# Patient Record
(demographics unavailable — no encounter records)

---

## 2025-07-25 NOTE — HISTORY OF PRESENT ILLNESS
[de-identified] : 07/25/2025: Date of Injury/Onset: 1 month ago Pain: At Rest: 2 With Activity: 4 Mechanism of injury: Patient reports left knee pain that began 1 month ago, denies RAYSA. She is ambulating with a cane.  Patient denies N/T. Patient states pain radiates to left shin.  She saw her PCP, had x-rays done at [facility].  This is NOT a Work Related Injury being treated under Worker's Compensation. This is NOT an athletic injury occurring associated with an interscholastic or organized sports team. Quality of symptoms: sharp pain Improves with: ice, Advil  Worse with: walking Prior treatment: n/a Prior Imaging: x-rays  Reports Available For Review Today: no Out of work/sport: retired   7/25/25:  61 y/o F with atraumatic knee pain that began one month prior. States no prior knee problems or history of CSI. Taking advil with relief. Was given meloxicam by PCP with no relief. No PT.   PMH: HTN, COPD

## 2025-07-25 NOTE — PROCEDURE
[FreeTextEntry1] : L knee csi [FreeTextEntry2] :  L knee pain [FreeTextEntry3] : Procedure: Kenalog injection of the left  Knee joint  Indication:  Inflammation and Joint pain.  Risk, benefits and alternatives were discussed with the patient. Potential complications include bleeding and infection.  Alcohol was used to prep the area.  Ethyl chloride spray was used as a topical anesthetic.  Using sterile technique, the aspiration/injection needle was then directed from a lateral and superior aspect.  The procedure was not ultrasound guided.  A 1.5 inch 21g needle was used to inject 3 mL of 1% Lidocaine, 3 mL 0.25% Bupivacaine and 1 mL 40mg/mL triamcinolone.  A bandage was applied.  The patient tolerated the procedure well.  Complications: None.  Patient instructed to avoid strenuous activity for 2 day(s).  Follow-up in the office as needed, in 3 months for repeat injection, if pain remains unresolved, or for further concerns.  Specifically counseled regarding the signs and symptoms of potential intraarticular infection and instructed to present promptly to clinic or hospital if such signs and symptoms arise.

## 2025-07-25 NOTE — DISCUSSION/SUMMARY
[de-identified] :     - We discussed their diagnosis and treatment options at length including the risks and benefits of both surgical treatment with a knee replacement and non-surgical options.  - We will continue conservative treatment with activity modification, PT, icing, weight loss, and anti-inflammatory medications.  - The patient was provided with a PT prescription to work on ROM, hip ER/abductors strengthening, quad/hamstring stretches and strengthening, and other exercises   - The patient was advised to let pain guide the gradual advancement of activities.   - We also discussed the possible of a corticosteroid injection in order to help decrease inflammation and pain so that they can perform better therapy.  - The risks, benefits, and alternatives to corticosteroid injection were reviewed with the patient and they wished to proceed with this treatment course.   - Follow up as needed in 6 weeks to re-evaluate, if no improvement we spoke about possibility of viscosupplementation injections

## 2025-07-25 NOTE — DATA REVIEWED
[FreeTextEntry1] : Left X-Ray Examination of the KNEE (4 views): medial and patellofemoral degenerate changes.

## 2025-07-25 NOTE — IMAGING
[de-identified] : LEFT KNEE  Inspection:  mild effusion  Palpation: medial joint line tenderness, anterior tenderness  Knee Range of Motion:  3-125   Strength: 5/5 Quadriceps strength, 5/5 Hamstring strength  Neurological: light touch is intact throughout  Ligament Stability and Special Tests:   McMurrays: neg  Lachman: neg  Pivot Shift: neg  Posterior Drawer: neg  Valgus: neg  Varus: neg  Patella Apprehension: neg  Patella Maltracking: neg